# Patient Record
Sex: MALE | Race: OTHER | URBAN - METROPOLITAN AREA
[De-identification: names, ages, dates, MRNs, and addresses within clinical notes are randomized per-mention and may not be internally consistent; named-entity substitution may affect disease eponyms.]

---

## 2017-05-24 NOTE — PATIENT DISCUSSION
New Prescription: Lotemax (loteprednol etabonate): gel: 0.5% 1 drop four times a day as directed into both eyes 05-

## 2017-05-24 NOTE — PATIENT DISCUSSION
MILD DRY EYES: PRESCRIBED ARTIFICIAL TEARS BID - QID, OU. ADD LOTEMAX GEL QID-BID FOR 1 MONTH. RETURN FOR FOLLOW-UP AS SCHEDULED OR SOONER IF SYMPTOMS WORSEN.

## 2018-06-13 NOTE — PATIENT DISCUSSION
MILD DRY EYES: PRESCRIBED ARTIFICIAL TEARS BID - QID, OU. RETURN FOR FOLLOW-UP AS SCHEDULED OR SOONER IF SYMPTOMS WORSEN.

## 2019-07-02 NOTE — PATIENT DISCUSSION
07/02/2019\[facility] [place] [facility]\""[id number]&nbsp;SN &nbsp; &nbsp; dch

## 2022-03-11 ENCOUNTER — PREPPED CHART (OUTPATIENT)
Dept: URBAN - METROPOLITAN AREA CLINIC 47 | Facility: CLINIC | Age: 52
End: 2022-03-11

## 2022-06-15 NOTE — PROCEDURE NOTE: CLINICAL
PROCEDURE NOTE: Punctal Plugs, Extended Bilateral Lower Lids. Diagnosis: Dry Eye Syndrome. Prior to treatment, the risks/benefits/alternatives were discussed. The patient wished to proceed with procedure. Extended duration plugs were inserted. Brand: Oasis. Bilateral Lower punctum. Patient tolerated procedure well. There were no complications. Post procedure instructions given. Azucean Nelson

## 2022-09-21 NOTE — PATIENT DISCUSSION
no evidence of active optic inflammation, followed by Dr Genesis [facility]).